# Patient Record
Sex: MALE | Race: BLACK OR AFRICAN AMERICAN | NOT HISPANIC OR LATINO | Employment: OTHER | URBAN - METROPOLITAN AREA
[De-identification: names, ages, dates, MRNs, and addresses within clinical notes are randomized per-mention and may not be internally consistent; named-entity substitution may affect disease eponyms.]

---

## 2021-04-01 PROBLEM — M48.061 SPINAL STENOSIS OF LUMBAR REGION: Status: ACTIVE | Noted: 2021-04-01

## 2021-04-01 PROBLEM — E11.9 CONTROLLED TYPE 2 DIABETES MELLITUS WITHOUT COMPLICATION, WITHOUT LONG-TERM CURRENT USE OF INSULIN: Status: ACTIVE | Noted: 2021-04-01

## 2021-04-01 PROBLEM — I10 ESSENTIAL HYPERTENSION: Status: ACTIVE | Noted: 2021-04-01

## 2021-04-01 PROBLEM — E78.49 OTHER HYPERLIPIDEMIA: Status: ACTIVE | Noted: 2021-04-01

## 2021-04-05 PROBLEM — Z13.84 ENCOUNTER FOR SCREENING FOR DENTAL DISORDER: Status: ACTIVE | Noted: 2021-04-05

## 2021-07-05 PROBLEM — Z13.84 ENCOUNTER FOR SCREENING FOR DENTAL DISORDER: Status: RESOLVED | Noted: 2021-04-05 | Resolved: 2021-07-05

## 2021-09-21 PROBLEM — B35.1 ONYCHOMYCOSIS OF GREAT TOE: Status: ACTIVE | Noted: 2021-09-21

## 2021-09-30 ENCOUNTER — LAB VISIT (OUTPATIENT)
Dept: LAB | Facility: HOSPITAL | Age: 71
End: 2021-09-30
Attending: NURSE PRACTITIONER

## 2021-09-30 DIAGNOSIS — E11.9 CONTROLLED TYPE 2 DIABETES MELLITUS WITHOUT COMPLICATION, WITHOUT LONG-TERM CURRENT USE OF INSULIN: ICD-10-CM

## 2021-09-30 LAB
ALBUMIN SERPL BCP-MCNC: 4 G/DL (ref 3.5–5.2)
ALP SERPL-CCNC: 61 U/L (ref 55–135)
ALT SERPL W/O P-5'-P-CCNC: 11 U/L (ref 10–44)
AST SERPL-CCNC: 17 U/L (ref 10–40)
BASOPHILS # BLD AUTO: 0.06 K/UL (ref 0–0.2)
BASOPHILS NFR BLD: 1.7 % (ref 0–1.9)
BILIRUB DIRECT SERPL-MCNC: 0.2 MG/DL (ref 0.1–0.3)
BILIRUB SERPL-MCNC: 0.3 MG/DL (ref 0.1–1)
CHOLEST SERPL-MCNC: 125 MG/DL (ref 120–199)
CHOLEST/HDLC SERPL: 2.9 {RATIO} (ref 2–5)
DIFFERENTIAL METHOD: ABNORMAL
EOSINOPHIL # BLD AUTO: 0.3 K/UL (ref 0–0.5)
EOSINOPHIL NFR BLD: 7 % (ref 0–8)
ERYTHROCYTE [DISTWIDTH] IN BLOOD BY AUTOMATED COUNT: 14.6 % (ref 11.5–14.5)
HCT VFR BLD AUTO: 38.5 % (ref 40–54)
HDLC SERPL-MCNC: 43 MG/DL (ref 40–75)
HDLC SERPL: 34.4 % (ref 20–50)
HGB BLD-MCNC: 12.6 G/DL (ref 14–18)
IMM GRANULOCYTES # BLD AUTO: 0.01 K/UL (ref 0–0.04)
IMM GRANULOCYTES NFR BLD AUTO: 0.3 % (ref 0–0.5)
LDLC SERPL CALC-MCNC: 64 MG/DL (ref 63–159)
LYMPHOCYTES # BLD AUTO: 1.2 K/UL (ref 1–4.8)
LYMPHOCYTES NFR BLD: 33.7 % (ref 18–48)
MCH RBC QN AUTO: 29.3 PG (ref 27–31)
MCHC RBC AUTO-ENTMCNC: 32.7 G/DL (ref 32–36)
MCV RBC AUTO: 90 FL (ref 82–98)
MONOCYTES # BLD AUTO: 0.4 K/UL (ref 0.3–1)
MONOCYTES NFR BLD: 11.8 % (ref 4–15)
NEUTROPHILS # BLD AUTO: 1.6 K/UL (ref 1.8–7.7)
NEUTROPHILS NFR BLD: 45.5 % (ref 38–73)
NONHDLC SERPL-MCNC: 82 MG/DL
NRBC BLD-RTO: 0 /100 WBC
PLATELET # BLD AUTO: 258 K/UL (ref 150–450)
PMV BLD AUTO: 11.4 FL (ref 9.2–12.9)
PROT SERPL-MCNC: 7.3 G/DL (ref 6–8.4)
RBC # BLD AUTO: 4.3 M/UL (ref 4.6–6.2)
TRIGL SERPL-MCNC: 90 MG/DL (ref 30–150)
WBC # BLD AUTO: 3.56 K/UL (ref 3.9–12.7)

## 2021-09-30 PROCEDURE — 83036 HEMOGLOBIN GLYCOSYLATED A1C: CPT | Performed by: NURSE PRACTITIONER

## 2021-09-30 PROCEDURE — 80061 LIPID PANEL: CPT | Performed by: NURSE PRACTITIONER

## 2021-09-30 PROCEDURE — 80076 HEPATIC FUNCTION PANEL: CPT | Performed by: NURSE PRACTITIONER

## 2021-09-30 PROCEDURE — 85025 COMPLETE CBC W/AUTO DIFF WBC: CPT | Performed by: NURSE PRACTITIONER

## 2021-10-01 LAB
ESTIMATED AVG GLUCOSE: 131 MG/DL (ref 68–131)
HBA1C MFR BLD: 6.2 % (ref 4–5.6)

## 2022-03-03 ENCOUNTER — LAB VISIT (OUTPATIENT)
Dept: LAB | Facility: HOSPITAL | Age: 72
End: 2022-03-03
Attending: NURSE PRACTITIONER

## 2022-03-03 DIAGNOSIS — R59.0 LYMPHADENOPATHY OF RIGHT CERVICAL REGION: ICD-10-CM

## 2022-03-03 DIAGNOSIS — E11.9 CONTROLLED TYPE 2 DIABETES MELLITUS WITHOUT COMPLICATION, WITHOUT LONG-TERM CURRENT USE OF INSULIN: ICD-10-CM

## 2022-03-03 DIAGNOSIS — D64.9 ANEMIA, UNSPECIFIED TYPE: ICD-10-CM

## 2022-03-03 LAB
ALBUMIN SERPL BCP-MCNC: 4.2 G/DL (ref 3.5–5.2)
ALP SERPL-CCNC: 84 U/L (ref 55–135)
ALT SERPL W/O P-5'-P-CCNC: 17 U/L (ref 10–44)
ANION GAP SERPL CALC-SCNC: 8 MMOL/L (ref 8–16)
AST SERPL-CCNC: 16 U/L (ref 10–40)
BASOPHILS # BLD AUTO: 0.05 K/UL (ref 0–0.2)
BASOPHILS NFR BLD: 1.2 % (ref 0–1.9)
BILIRUB SERPL-MCNC: 0.2 MG/DL (ref 0.1–1)
BUN SERPL-MCNC: 10 MG/DL (ref 8–23)
CALCIUM SERPL-MCNC: 9.4 MG/DL (ref 8.7–10.5)
CHLORIDE SERPL-SCNC: 103 MMOL/L (ref 95–110)
CO2 SERPL-SCNC: 28 MMOL/L (ref 23–29)
CREAT SERPL-MCNC: 0.8 MG/DL (ref 0.5–1.4)
DIFFERENTIAL METHOD: ABNORMAL
EOSINOPHIL # BLD AUTO: 0.3 K/UL (ref 0–0.5)
EOSINOPHIL NFR BLD: 6.1 % (ref 0–8)
ERYTHROCYTE [DISTWIDTH] IN BLOOD BY AUTOMATED COUNT: 13.8 % (ref 11.5–14.5)
EST. GFR  (AFRICAN AMERICAN): >60 ML/MIN/1.73 M^2
EST. GFR  (NON AFRICAN AMERICAN): >60 ML/MIN/1.73 M^2
FERRITIN SERPL-MCNC: 155 NG/ML (ref 20–300)
GLUCOSE SERPL-MCNC: 111 MG/DL (ref 70–110)
HCT VFR BLD AUTO: 40.1 % (ref 40–54)
HGB BLD-MCNC: 12.5 G/DL (ref 14–18)
IMM GRANULOCYTES # BLD AUTO: 0.01 K/UL (ref 0–0.04)
IMM GRANULOCYTES NFR BLD AUTO: 0.2 % (ref 0–0.5)
IRON SERPL-MCNC: 76 UG/DL (ref 45–160)
LYMPHOCYTES # BLD AUTO: 1.3 K/UL (ref 1–4.8)
LYMPHOCYTES NFR BLD: 31.6 % (ref 18–48)
MCH RBC QN AUTO: 29.1 PG (ref 27–31)
MCHC RBC AUTO-ENTMCNC: 31.2 G/DL (ref 32–36)
MCV RBC AUTO: 94 FL (ref 82–98)
MONOCYTES # BLD AUTO: 0.5 K/UL (ref 0.3–1)
MONOCYTES NFR BLD: 11.2 % (ref 4–15)
NEUTROPHILS # BLD AUTO: 2 K/UL (ref 1.8–7.7)
NEUTROPHILS NFR BLD: 49.7 % (ref 38–73)
NRBC BLD-RTO: 0 /100 WBC
PLATELET # BLD AUTO: 269 K/UL (ref 150–450)
PMV BLD AUTO: 10.7 FL (ref 9.2–12.9)
POTASSIUM SERPL-SCNC: 4.6 MMOL/L (ref 3.5–5.1)
PROT SERPL-MCNC: 7.7 G/DL (ref 6–8.4)
RBC # BLD AUTO: 4.29 M/UL (ref 4.6–6.2)
SATURATED IRON: 15 % (ref 20–50)
SODIUM SERPL-SCNC: 139 MMOL/L (ref 136–145)
TOTAL IRON BINDING CAPACITY: 505 UG/DL (ref 250–450)
TRANSFERRIN SERPL-MCNC: 341 MG/DL (ref 200–375)
WBC # BLD AUTO: 4.11 K/UL (ref 3.9–12.7)

## 2022-03-03 PROCEDURE — 84466 ASSAY OF TRANSFERRIN: CPT | Performed by: NURSE PRACTITIONER

## 2022-03-03 PROCEDURE — 85025 COMPLETE CBC W/AUTO DIFF WBC: CPT | Performed by: NURSE PRACTITIONER

## 2022-03-03 PROCEDURE — 82728 ASSAY OF FERRITIN: CPT | Performed by: NURSE PRACTITIONER

## 2022-03-03 PROCEDURE — 80053 COMPREHEN METABOLIC PANEL: CPT | Performed by: NURSE PRACTITIONER

## 2022-03-03 PROCEDURE — 83036 HEMOGLOBIN GLYCOSYLATED A1C: CPT | Performed by: NURSE PRACTITIONER

## 2022-03-04 LAB
ESTIMATED AVG GLUCOSE: 131 MG/DL (ref 68–131)
HBA1C MFR BLD: 6.2 % (ref 4–5.6)

## 2024-05-02 ENCOUNTER — OFFICE VISIT (OUTPATIENT)
Facility: CLINIC | Age: 74
End: 2024-05-02
Payer: MEDICARE

## 2024-05-02 VITALS
HEIGHT: 74 IN | OXYGEN SATURATION: 95 % | RESPIRATION RATE: 18 BRPM | WEIGHT: 222.69 LBS | TEMPERATURE: 98 F | SYSTOLIC BLOOD PRESSURE: 128 MMHG | HEART RATE: 67 BPM | DIASTOLIC BLOOD PRESSURE: 70 MMHG | BODY MASS INDEX: 28.58 KG/M2

## 2024-05-02 DIAGNOSIS — R35.89 INCREASED URINE OUTPUT: Primary | ICD-10-CM

## 2024-05-02 DIAGNOSIS — E11.9 TYPE 2 DIABETES MELLITUS WITHOUT COMPLICATION, WITHOUT LONG-TERM CURRENT USE OF INSULIN: ICD-10-CM

## 2024-05-02 DIAGNOSIS — R35.0 FREQUENCY OF URINATION: ICD-10-CM

## 2024-05-02 PROCEDURE — 99214 OFFICE O/P EST MOD 30 MIN: CPT | Mod: PBBFAC,PN

## 2024-05-02 PROCEDURE — 99999 PR PBB SHADOW E&M-EST. PATIENT-LVL IV: CPT | Mod: PBBFAC,,,

## 2024-05-02 PROCEDURE — 99214 OFFICE O/P EST MOD 30 MIN: CPT | Mod: S$PBB,,,

## 2024-05-02 RX ORDER — INSULIN PUMP SYRINGE, 3 ML
EACH MISCELLANEOUS
Qty: 1 EACH | Refills: 0 | Status: SHIPPED | OUTPATIENT
Start: 2024-05-02 | End: 2025-05-02

## 2024-05-02 RX ORDER — TAMSULOSIN HYDROCHLORIDE 0.4 MG/1
0.4 CAPSULE ORAL DAILY
Qty: 30 CAPSULE | Refills: 2 | Status: SHIPPED | OUTPATIENT
Start: 2024-05-02 | End: 2024-07-31

## 2024-05-02 RX ORDER — METFORMIN HYDROCHLORIDE 500 MG/1
500 TABLET ORAL 2 TIMES DAILY WITH MEALS
Qty: 180 TABLET | Refills: 0 | Status: SHIPPED | OUTPATIENT
Start: 2024-05-02 | End: 2024-07-31

## 2024-05-02 RX ORDER — HYDROCODONE BITARTRATE AND ACETAMINOPHEN 10; 325 MG/1; MG/1
1 TABLET ORAL 4 TIMES DAILY PRN
COMMUNITY
Start: 2024-04-22

## 2024-05-02 RX ORDER — CARVEDILOL 25 MG/1
1 TABLET ORAL 2 TIMES DAILY WITH MEALS
COMMUNITY
Start: 2023-05-18 | End: 2024-05-17

## 2024-05-02 RX ORDER — LOSARTAN POTASSIUM 100 MG/1
1 TABLET ORAL DAILY
COMMUNITY
Start: 2024-02-14

## 2024-05-02 RX ORDER — AMLODIPINE BESYLATE 2.5 MG/1
1 TABLET ORAL 2 TIMES DAILY
COMMUNITY
Start: 2024-02-05

## 2024-05-02 RX ORDER — LANCETS
EACH MISCELLANEOUS
Qty: 200 EACH | Refills: 0 | Status: SHIPPED | OUTPATIENT
Start: 2024-05-02

## 2024-05-02 RX ORDER — FENOFIBRATE 160 MG/1
1 TABLET ORAL DAILY
COMMUNITY
Start: 2023-05-18

## 2024-05-02 NOTE — PROGRESS NOTES
SUBJECTIVE     Chief Complaint   Patient presents with    Urinary Frequency     Follow up       HPI  4/18/2024  Hugo Daley is a 73 y.o. male with multiple medical diagnoses as listed in the medical history and problem list that presents for evaluation for increased urination for the past month. Pt is new to me. Pt states he just made appt due to frequent night urination symptoms. Pt states he had these symptoms once before and it seem to have cleared up over several weeks. Pt states it has since returned but not cleared up. Pt states he uses the bathroom about 10-12 times throughout the night. Pt denies thirst, headaches, blurry vision, or pain. Pt reports sleep disturbances where he is averaging about 3-4 hours a sleep a night due to waking up to urinate. Pt reports taking OTC super beta prostate supplements. Pt states he may go a whole day and only urinate once but frequency. Pt reports he has been taking them about 1 month with little relief from frequent urination. Pt reports drinking tequila 3-4 times a week.     5/2/2024  Pt presents today for a follow up for increased urine output. Pt states that he has been fully compliant with Rx Medication and states he has completed therapy dose. Pt state he has also noticed that he is urinating less frequently about 3-4 times a night verses 10-12 times a night. Pt also reports he urinates very little during the day but mostly at night when he is trying to sleep.  Pt also states he is no longer drinking tequila at this time.     Also discussed with patient most recent labs. Informed patient of current A1c of 6.7% up from 2 years ago of 6.2. Pt states he has been drinking [employer] Rootbeer and Lemonade most all the time. Pt denies urgency, changes in vision, fever, chills, body aches, or recent hospitalizations.         PAST MEDICAL HISTORY:  Past Medical History:   Diagnosis Date    Arthritis     Diabetes mellitus, type 2     Hyperlipidemia     Hypertension        PAST  SURGICAL HISTORY:  Past Surgical History:   Procedure Laterality Date    lower back surgery         SOCIAL HISTORY:  Social History     Socioeconomic History    Marital status:    Occupational History    Occupation: self   Tobacco Use    Smoking status: Former     Current packs/day: 0.00     Average packs/day: 1 pack/day for 30.0 years (30.0 ttl pk-yrs)     Types: Cigarettes     Start date: 1971     Quit date: 2001     Years since quittin.1    Smokeless tobacco: Never   Substance and Sexual Activity    Alcohol use: Yes     Alcohol/week: 2.0 standard drinks of alcohol     Types: 2 Cans of beer per week     Comment: 1-2 cans every couple of days     Drug use: Never    Sexual activity: Yes     Partners: Female     Social Determinants of Health     Financial Resource Strain: Low Risk  (2024)    Overall Financial Resource Strain (CARDIA)     Difficulty of Paying Living Expenses: Not hard at all   Food Insecurity: No Food Insecurity (2024)    Hunger Vital Sign     Worried About Running Out of Food in the Last Year: Never true     Ran Out of Food in the Last Year: Never true   Transportation Needs: No Transportation Needs (2024)    PRAPARE - Transportation     Lack of Transportation (Medical): No     Lack of Transportation (Non-Medical): No   Physical Activity: Sufficiently Active (2024)    Exercise Vital Sign     Days of Exercise per Week: 5 days     Minutes of Exercise per Session: 40 min   Stress: No Stress Concern Present (2024)    [employer] - Occupational Stress Questionnaire     Feeling of Stress : Not at all   Housing Stability: Low Risk  (2024)    Housing Stability Vital Sign     Unable to Pay for Housing in the Last Year: No     Homeless in the Last Year: No       FAMILY HISTORY:  Family History   Problem Relation Name Age of Onset    Hypertension Mother      Hypertension Father      Hypertension Sister      Hypertension Brother       Amblyopia Neg Hx      Blindness Neg Hx      Cancer Neg Hx      Cataracts Neg Hx      Diabetes Neg Hx      Glaucoma Neg Hx      Macular degeneration Neg Hx      Retinal detachment Neg Hx      Strabismus Neg Hx      Stroke Neg Hx      Thyroid disease Neg Hx         ALLERGIES AND MEDICATIONS: updated and reviewed.  Review of patient's allergies indicates:  No Known Allergies  Current Outpatient Medications   Medication Sig Dispense Refill    amLODIPine (NORVASC) 2.5 MG tablet Take 1 tablet by mouth 2 (two) times daily.      aspirin 325 MG tablet Take 325 mg by mouth once.      carvediloL (COREG) 25 MG tablet Take 1 tablet by mouth 2 (two) times daily with meals.      fenofibrate 160 MG Tab Take 1 tablet by mouth once daily.      HYDROcodone-acetaminophen (NORCO)  mg per tablet Take 1 tablet by mouth 4 (four) times daily as needed.      losartan (COZAAR) 100 MG tablet Take 1 tablet by mouth once daily.      olmesartan (BENICAR) 5 MG Tab Take 5 mg by mouth once daily.      omega-3 acid ethyl esters (LOVAZA) 1 gram capsule Take 2 g by mouth 2 (two) times daily.      rosuvastatin (CRESTOR) 10 MG tablet Take 10 mg by mouth once daily.      blood sugar diagnostic Strp To check BG 3 times daily, to use with insurance preferred meter 200 each 0    blood-glucose meter kit To check BG 3 times daily, to use with insurance preferred meter 1 each 0    hydrOXYzine pamoate (VISTARIL) 50 MG Cap TAKE 1 CAPSULE(50 MG) BY MOUTH EVERY NIGHT AS NEEDED FOR INSOMNIA (Patient not taking: Reported on 5/2/2024) 30 capsule 2    lancets Misc To check BG 3 times daily, to use with insurance preferred meter 200 each 0    metFORMIN (GLUCOPHAGE) 500 MG tablet Take 1 tablet (500 mg total) by mouth 2 (two) times daily with meals. 180 tablet 0    tamsulosin (FLOMAX) 0.4 mg Cap Take 1 capsule (0.4 mg total) by mouth once daily. 30 capsule 2     No current facility-administered medications for this visit.       ROS  Review of Systems  "  Constitutional:  Negative for appetite change, chills, diaphoresis, fatigue, fever and unexpected weight change.   HENT:  Negative for congestion, ear pain, postnasal drip, rhinorrhea, sinus pressure, sinus pain and sore throat.    Eyes:  Negative for pain and redness.   Respiratory:  Negative for cough, choking, chest tightness and shortness of breath.    Cardiovascular:  Negative for chest pain, palpitations and leg swelling.   Gastrointestinal:  Negative for abdominal pain, constipation, diarrhea, nausea and vomiting.   Endocrine: Negative for cold intolerance and heat intolerance.   Genitourinary:  Positive for frequency. Negative for decreased urine volume, difficulty urinating, dysuria, flank pain, hematuria, penile pain and urgency.   Musculoskeletal:  Negative for back pain, neck pain and neck stiffness.   Skin:  Negative for color change, pallor and rash.   Allergic/Immunologic: Negative for food allergies.   Neurological:  Negative for dizziness, tremors, weakness, light-headedness and headaches.   Psychiatric/Behavioral:  Negative for agitation and confusion. The patient is not nervous/anxious.          OBJECTIVE     Physical Exam  Vitals:    05/02/24 1321   BP: 128/70   Pulse: 67   Resp: 18   Temp: 98.4 °F (36.9 °C)    Body mass index is 28.59 kg/m².  Weight: 101 kg (222 lb 10.6 oz)   Height: 6' 2" (188 cm)     Physical Exam  Constitutional:       General: He is not in acute distress.     Appearance: Normal appearance. He is not ill-appearing or diaphoretic.   HENT:      Right Ear: Tympanic membrane normal. There is no impacted cerumen.      Left Ear: Tympanic membrane normal. There is no impacted cerumen.      Nose: Nose normal. No congestion or rhinorrhea.      Mouth/Throat:      Mouth: Mucous membranes are moist.      Pharynx: Oropharynx is clear. No oropharyngeal exudate or posterior oropharyngeal erythema.   Eyes:      General:         Right eye: No discharge.         Left eye: No discharge. "   Cardiovascular:      Rate and Rhythm: Normal rate and regular rhythm.      Pulses: Normal pulses.      Heart sounds: Normal heart sounds.   Pulmonary:      Effort: Pulmonary effort is normal.      Breath sounds: Normal breath sounds.   Abdominal:      General: Bowel sounds are normal. There is no distension.      Palpations: Abdomen is soft.      Tenderness: There is no abdominal tenderness. There is no guarding.   Musculoskeletal:         General: No swelling or tenderness.      Cervical back: Normal range of motion. No rigidity or tenderness.      Right lower leg: No edema.      Left lower leg: No edema.   Skin:     General: Skin is warm and dry.      Findings: No bruising, erythema, lesion or rash.   Neurological:      Mental Status: He is alert and oriented to person, place, and time.      Motor: No weakness.      Gait: Gait normal.   Psychiatric:         Mood and Affect: Mood normal.         Behavior: Behavior normal.         Health Maintenance         Date Due Completion Date    Hepatitis C Screening Never done ---    Foot Exam Never done ---    Shingles Vaccine (1 of 2) Never done ---    RSV Vaccine (Age 60+ and Pregnant patients) (1 - 1-dose 60+ series) Never done ---    Pneumococcal Vaccines (Age 65+) (1 of 1 - PCV) Never done ---    Abdominal Aortic Aneurysm Screening Never done ---    COVID-19 Vaccine (5 - 2023-24 season) 09/01/2023 1/13/2023    Influenza Vaccine (Season Ended) 09/01/2024 ---    Hemoglobin A1c 04/18/2025 4/18/2024    Override on 4/5/2021: Done (scanned in media- [facility] lab)    Lipid Panel 08/09/2028 8/9/2023    Colorectal Cancer Screening 07/01/2030 7/1/2020 (Done)    Override on 7/1/2020: Done (per pt normal)    TETANUS VACCINE 09/20/2031 9/20/2021              ASSESSMENT     73 y.o. male with     1. Increased urine output    2. Type 2 diabetes mellitus without complication, without long-term current use of insulin    3. Frequency of urination        PLAN:     1. Increased urine  output  Improving, Treating;   - Educated patient on changes in urinary patterns with age and sometime incomplete emptying.   - Instructed patient to began Rx therapy daily in the morning to promote emptying with urination  - Follow up in 3 months  - tamsulosin (FLOMAX) 0.4 mg Cap; Take 1 capsule (0.4 mg total) by mouth once daily.  Dispense: 30 capsule; Refill: 2    2. Type 2 diabetes mellitus without complication, without long-term current use of insulin  Controlled; Treating  - Latest A1c elevated to 6.7% from previous 6.2% two years ago.   - Instructed patient to began Rx therapy of Metformin 500 twice daily with food.   - Follow up in 3 months  - metFORMIN (GLUCOPHAGE) 500 MG tablet; Take 1 tablet (500 mg total) by mouth 2 (two) times daily with meals.  Dispense: 180 tablet; Refill: 0  - blood-glucose meter kit; To check BG 3 times daily, to use with insurance preferred meter  Dispense: 1 each; Refill: 0  - lancets Misc; To check BG 3 times daily, to use with insurance preferred meter  Dispense: 200 each; Refill: 0  - blood sugar diagnostic Strp; To check BG 3 times daily, to use with insurance preferred meter  Dispense: 200 each; Refill: 0    3. Frequency of urination  Improving, Treating;   - Educated patient on changes in urinary patterns with age and sometime incomplete emptying.   - Instructed patient to began Rx therapy daily in the morning to promote emptying with urination  - Follow up in 3 months  - tamsulosin (FLOMAX) 0.4 mg Cap; Take 1 capsule (0.4 mg total) by mouth once daily.  Dispense: 30 capsule; Refill: 2    RTC in 3 months or sooner if needed    Jag Shaikh, PROMISE, APRN, FNP-BC  [facility]  05/02/2024 1:40 PM

## 2024-08-02 ENCOUNTER — OFFICE VISIT (OUTPATIENT)
Facility: CLINIC | Age: 74
End: 2024-08-02
Payer: MEDICARE

## 2024-08-02 VITALS
RESPIRATION RATE: 18 BRPM | OXYGEN SATURATION: 96 % | SYSTOLIC BLOOD PRESSURE: 126 MMHG | HEART RATE: 90 BPM | DIASTOLIC BLOOD PRESSURE: 80 MMHG | WEIGHT: 212.5 LBS | TEMPERATURE: 98 F | BODY MASS INDEX: 27.27 KG/M2 | HEIGHT: 74 IN

## 2024-08-02 DIAGNOSIS — E78.49 OTHER HYPERLIPIDEMIA: ICD-10-CM

## 2024-08-02 DIAGNOSIS — E11.9 TYPE 2 DIABETES MELLITUS WITHOUT COMPLICATION, WITHOUT LONG-TERM CURRENT USE OF INSULIN: Primary | ICD-10-CM

## 2024-08-02 DIAGNOSIS — M54.2 NECK PAIN: ICD-10-CM

## 2024-08-02 PROCEDURE — 99999 PR PBB SHADOW E&M-EST. PATIENT-LVL V: CPT | Mod: PBBFAC,,,

## 2024-08-02 PROCEDURE — 99215 OFFICE O/P EST HI 40 MIN: CPT | Mod: PBBFAC,PN

## 2024-08-02 PROCEDURE — 99214 OFFICE O/P EST MOD 30 MIN: CPT | Mod: S$PBB,,,

## 2024-08-02 RX ORDER — METHYLPREDNISOLONE 4 MG/1
TABLET ORAL
COMMUNITY
Start: 2024-07-11

## 2024-08-02 RX ORDER — CYCLOBENZAPRINE HCL 10 MG
10 TABLET ORAL 3 TIMES DAILY
COMMUNITY
Start: 2024-07-11

## 2024-08-02 RX ORDER — CELECOXIB 200 MG/1
200 CAPSULE ORAL
COMMUNITY
Start: 2024-07-12

## 2025-07-09 ENCOUNTER — PATIENT OUTREACH (OUTPATIENT)
Dept: ADMINISTRATIVE | Facility: OTHER | Age: 75
End: 2025-07-09
Payer: MEDICARE

## 2025-07-09 ENCOUNTER — LAB VISIT (OUTPATIENT)
Dept: LAB | Facility: HOSPITAL | Age: 75
End: 2025-07-09
Payer: MEDICARE

## 2025-07-09 ENCOUNTER — OFFICE VISIT (OUTPATIENT)
Facility: CLINIC | Age: 75
End: 2025-07-09
Payer: MEDICARE

## 2025-07-09 VITALS
TEMPERATURE: 98 F | DIASTOLIC BLOOD PRESSURE: 88 MMHG | RESPIRATION RATE: 18 BRPM | SYSTOLIC BLOOD PRESSURE: 136 MMHG | BODY MASS INDEX: 27.63 KG/M2 | HEIGHT: 74 IN | HEART RATE: 66 BPM | OXYGEN SATURATION: 96 % | WEIGHT: 215.25 LBS

## 2025-07-09 DIAGNOSIS — Z11.59 NEED FOR HEPATITIS C SCREENING TEST: ICD-10-CM

## 2025-07-09 DIAGNOSIS — I10 ESSENTIAL HYPERTENSION: ICD-10-CM

## 2025-07-09 DIAGNOSIS — E78.49 OTHER HYPERLIPIDEMIA: ICD-10-CM

## 2025-07-09 DIAGNOSIS — G47.09 OTHER INSOMNIA: ICD-10-CM

## 2025-07-09 DIAGNOSIS — E11.9 TYPE 2 DIABETES MELLITUS WITHOUT COMPLICATION, WITHOUT LONG-TERM CURRENT USE OF INSULIN: Primary | ICD-10-CM

## 2025-07-09 DIAGNOSIS — E11.9 TYPE 2 DIABETES MELLITUS WITHOUT COMPLICATION, WITHOUT LONG-TERM CURRENT USE OF INSULIN: ICD-10-CM

## 2025-07-09 LAB
ALBUMIN SERPL BCP-MCNC: 4.3 G/DL (ref 3.5–5.2)
ALP SERPL-CCNC: 74 UNIT/L (ref 40–150)
ALT SERPL W/O P-5'-P-CCNC: 18 UNIT/L (ref 10–44)
ANION GAP (OHS): 10 MMOL/L (ref 8–16)
AST SERPL-CCNC: 13 UNIT/L (ref 11–45)
BILIRUB SERPL-MCNC: 0.3 MG/DL (ref 0.1–1)
BUN SERPL-MCNC: 13 MG/DL (ref 8–23)
CALCIUM SERPL-MCNC: 9.3 MG/DL (ref 8.7–10.5)
CHLORIDE SERPL-SCNC: 106 MMOL/L (ref 95–110)
CO2 SERPL-SCNC: 25 MMOL/L (ref 23–29)
CREAT SERPL-MCNC: 0.8 MG/DL (ref 0.5–1.4)
GFR SERPLBLD CREATININE-BSD FMLA CKD-EPI: >60 ML/MIN/1.73/M2
GLUCOSE SERPL-MCNC: 98 MG/DL (ref 70–110)
POTASSIUM SERPL-SCNC: 4.4 MMOL/L (ref 3.5–5.1)
PROT SERPL-MCNC: 7.9 GM/DL (ref 6–8.4)
SODIUM SERPL-SCNC: 141 MMOL/L (ref 136–145)

## 2025-07-09 PROCEDURE — 86803 HEPATITIS C AB TEST: CPT

## 2025-07-09 PROCEDURE — 36415 COLL VENOUS BLD VENIPUNCTURE: CPT | Mod: PN

## 2025-07-09 PROCEDURE — 99215 OFFICE O/P EST HI 40 MIN: CPT | Mod: PBBFAC,PN

## 2025-07-09 PROCEDURE — 80053 COMPREHEN METABOLIC PANEL: CPT

## 2025-07-09 PROCEDURE — 99999 PR PBB SHADOW E&M-EST. PATIENT-LVL V: CPT | Mod: PBBFAC,,,

## 2025-07-09 PROCEDURE — G2211 COMPLEX E/M VISIT ADD ON: HCPCS | Mod: ,,,

## 2025-07-09 PROCEDURE — 99214 OFFICE O/P EST MOD 30 MIN: CPT | Mod: S$PBB,,,

## 2025-07-09 PROCEDURE — 82570 ASSAY OF URINE CREATININE: CPT

## 2025-07-09 RX ORDER — METFORMIN HYDROCHLORIDE 500 MG/1
500 TABLET ORAL 2 TIMES DAILY WITH MEALS
Qty: 60 TABLET | Refills: 6 | Status: SHIPPED | OUTPATIENT
Start: 2025-07-09

## 2025-07-09 NOTE — PROGRESS NOTES
CHW - Initial Contact    This Community Health Worker updated the Social Determinant of Health questionnaire with patient during clinic visit today.    Pt identified barriers of most importance are: Patient did not report any barriers.      Other information discussed the patient needs / wants help with: SDOH updated. Patient did not report any barriers at this time. CHW advised patient if the need arise to reach out to me directly in regards. Patient's case will be closed.   Follow up required: No.

## 2025-07-09 NOTE — PROGRESS NOTES
SUBJECTIVE     Chief Complaint   Patient presents with    Annual Exam    Medication Refill       HPI  Hugo Daley is a 74 y.o. male with multiple medical diagnoses as listed in the medical history and problem list that presents for annual exam. Pt is new to me. He has a good appetite and eats well. He does exercise. Pt does take OTC supplements. He does not have any current stressors. Pt is UTD on age appropriate CA screening.     HPI    History of Present Illness    CHIEF COMPLAINT:  Patient presents today for diabetes medication refill.    DIABETES:  He monitors glucose at home with readings consistently between . Recent A1C in March was 6.7, indicating diabetes. He has a family history of diabetes and is seeking Metformin prescription for management.    HYPERTENSION:  Home blood pressure reading is 117/73, typically remaining under 117 though tends to elevate during clinic visits. He continues losartan 100mg for management.    SLEEP:  He reports poor sleep quality with significant fragmentation, describing a pattern of sleeping for one hour before waking, followed by another hour of sleep before waking again.    EXERCISE AND LIFESTYLE:  He walks three miles daily and expresses satisfaction with this as his primary form of physical activity.    MENTAL HEALTH:  He reports minimal stress levels with no significant stressors.    MEDICATIONS AND SUPPLEMENTS:  He takes losartan 100mg and fish oil supplement.    DENTAL:  Last dental exam was prior to COVID-19 pandemic.      ROS:  General: -fever, -chills, -fatigue, -weight gain, -weight loss, +difficulty staying asleep, +sleep disturbances  Eyes: -vision changes, -redness, -discharge  ENT: -ear pain, -nasal congestion, -sore throat  Cardiovascular: -chest pain, -palpitations, -lower extremity edema  Respiratory: -cough, -shortness of breath  Gastrointestinal: -abdominal pain, -nausea, -vomiting, -diarrhea, -constipation, -blood in stool  Genitourinary: -dysuria,  -hematuria, -frequency  Musculoskeletal: -joint pain, -muscle pain  Skin: -rash, -lesion  Neurological: -headache, -dizziness, -numbness, -tingling  Psychiatric: -anxiety, -depression, -sleep difficulty         PAST MEDICAL HISTORY:  Past Medical History:   Diagnosis Date    Arthritis     Diabetes mellitus, type 2     Hyperlipidemia     Hypertension        PAST SURGICAL HISTORY:  Past Surgical History:   Procedure Laterality Date    lower back surgery  1995       SOCIAL HISTORY:  Social History[1]    FAMILY HISTORY:  Family History   Problem Relation Name Age of Onset    Hypertension Mother      Hypertension Father      Hypertension Sister      Hypertension Brother      No Known Problems Daughter      No Known Problems Son      No Known Problems Maternal Aunt      No Known Problems Maternal Uncle      No Known Problems Paternal Aunt      No Known Problems Paternal Uncle      No Known Problems Maternal Grandmother      No Known Problems Maternal Grandfather      No Known Problems Paternal Grandmother      No Known Problems Paternal Grandfather      No Known Problems Maternal Cousin      No Known Problems Paternal Cousin      No Known Problems Other      Amblyopia Neg Hx      Blindness Neg Hx      Cancer Neg Hx      Cataracts Neg Hx      Diabetes Neg Hx      Glaucoma Neg Hx      Macular degeneration Neg Hx      Retinal detachment Neg Hx      Strabismus Neg Hx      Stroke Neg Hx      Thyroid disease Neg Hx         ALLERGIES AND MEDICATIONS: updated and reviewed.  Review of patient's allergies indicates:  No Known Allergies  Current Outpatient Medications   Medication Sig Dispense Refill    amLODIPine (NORVASC) 2.5 MG tablet Take 1 tablet by mouth 2 (two) times daily.      aspirin 325 MG tablet Take 325 mg by mouth once.      blood sugar diagnostic Strp To check BG 3 times daily, to use with insurance preferred meter 200 each 0    carvediloL (COREG) 25 MG tablet Take 1 tablet by mouth 2 (two) times daily with meals.    "   lancets Misc To check BG 3 times daily, to use with insurance preferred meter 200 each 0    losartan (COZAAR) 100 MG tablet Take 1 tablet by mouth once daily.      olmesartan (BENICAR) 5 MG Tab Take 5 mg by mouth once daily.      omega-3 acid ethyl esters (LOVAZA) 1 gram capsule Take 2 g by mouth 2 (two) times daily.      rosuvastatin (CRESTOR) 10 MG tablet Take 10 mg by mouth once daily.      blood-glucose meter kit To check BG 3 times daily, to use with insurance preferred meter 1 each 0    metFORMIN (GLUCOPHAGE) 500 MG tablet Take 1 tablet (500 mg total) by mouth 2 (two) times daily with meals. 60 tablet 6    tamsulosin (FLOMAX) 0.4 mg Cap Take 1 capsule (0.4 mg total) by mouth once daily. 30 capsule 2     No current facility-administered medications for this visit.       OBJECTIVE     Physical Exam  Vitals:    07/09/25 1316   BP: 136/88   Pulse: 66   Resp: 18   Temp: 97.7 °F (36.5 °C)    Body mass index is 27.64 kg/m².  Weight: 97.7 kg (215 lb 4.5 oz)   Height: 6' 2" (188 cm)     Physical Exam  Vitals reviewed.   Constitutional:       General: He is not in acute distress.  HENT:      Right Ear: External ear normal.      Left Ear: External ear normal.      Nose: Nose normal.      Mouth/Throat:      Mouth: Mucous membranes are moist.   Eyes:      Extraocular Movements: Extraocular movements intact.      Conjunctiva/sclera: Conjunctivae normal.      Pupils: Pupils are equal, round, and reactive to light.   Pulmonary:      Effort: Pulmonary effort is normal.   Abdominal:      General: There is no distension.      Palpations: Abdomen is soft.   Musculoskeletal:         General: No swelling. Normal range of motion.      Cervical back: Normal range of motion.   Skin:     General: Skin is warm and dry.      Findings: No rash.   Neurological:      General: No focal deficit present.      Mental Status: He is alert and oriented to person, place, and time.   Psychiatric:         Mood and Affect: Mood normal.         " Behavior: Behavior normal.       Health Maintenance         Date Due Completion Date    Foot Exam Never done ---    Pneumococcal Vaccines (Age 50+) (1 of 2 - PCV) Never done ---    Shingles Vaccine (1 of 2) Never done ---    Diabetic Eye Exam 12/18/2014 12/18/2013    Abdominal Aortic Aneurysm Screening Never done ---    COVID-19 Vaccine (5 - 2024-25 season) 09/01/2024 1/13/2023    Influenza Vaccine (1) 09/01/2025 ---    Hemoglobin A1c 09/25/2025 3/25/2025    Override on 4/5/2021: Done (scanned in media- [facility])    RSV Vaccine (Age 60+ and Pregnant patients) (1 - 1-dose 75+ series) 12/19/2025 ---    Lipid Panel 03/25/2026 3/25/2025    Low Dose Statin 07/09/2026 7/9/2025    Diabetes Urine Screening 07/09/2026 7/9/2025    Colorectal Cancer Screening 07/01/2030 7/1/2020 (Done)    Override on 7/1/2020: Done (per pt normal)    TETANUS VACCINE 09/20/2031 9/20/2021          ASSESSMENT     74 y.o. male with     1. Type 2 diabetes mellitus without complication, without long-term current use of insulin    2. Essential hypertension    3. Need for hepatitis C screening test    4. Other hyperlipidemia    5. Other insomnia        PLAN:     1. Type 2 diabetes mellitus without complication, without long-term current use of insulin  Improving. Refilled metformin.   Ordered labs to check levels. Will treat accordingly once labs are reviewed.    Patient is encouraged to follow a diet low in carbohydrates and simple sugars.  Discussed simple vs. complex carbohydrates. Advised to focus on good food choices and increased physical activity and encouraged to adhere to medication regimen and/or lifestyle adjustments, and to check glucose level as recommended.  Contact office if glucose levels are not improving over time.  Will monitor HbA1c appropriately.    - metFORMIN (GLUCOPHAGE) 500 MG tablet; Take 1 tablet (500 mg total) by mouth 2 (two) times daily with meals.  Dispense: 60 tablet; Refill: 6  - Comprehensive Metabolic Panel;  Future  - Microalbumin/Creatinine Ratio, Urine; Future  - Ambulatory referral/consult to Optometry; Future    2. Essential hypertension  Stable.   - Continuing current medication regimen as prescribed by Dr. Donato (cardiologist): losartan 100 mg, amlodipine, and carvedilol.    3. Need for hepatitis C screening test  Due. Ordered.   - Hepatitis C Antibody; Future    4. Other hyperlipidemia  Stable.  Minimize fried foods, greasy foods, brown gravies, cheese and butter sauces. Increase baked fish, take fish oil pills (3000mg daily), eat almonds, and exercise in order  to raise your good cholesterol.    - Continue fenofibrate and fish oil supplement as prescribed by Dr. Donato    5. Other insomnia  Chronic.   -Counseled on sleep hygiene and recommended magnesium glycinate or L-threonate.   - Plan to refer to sleep medicine.       Assessment & Plan    TYPE 2 DIABETES MELLITUS WITHOUT COMPLICATION, WITHOUT LONG-TERM CURRENT USE OF INSULIN:  - Reviewed recent lab results from March showing A1C of 6.7 (slightly above target range of 6.5), indicating diabetes.  - Home blood sugar monitoring shows ranges from 99 to 111.  - Prescribed Metformin to be taken twice daily for diabetes management and advised patient to maintain blood sugar to prevent further A1C increase.  - Ordered diabetic eye exam and emphasized its annual importance.  - Ordered urine test for kidney function assessment, which patient can drop off at their convenience.  - Follow-up appointment scheduled in September for sugar test.    HYPERTENSION:  - Patient's blood pressure is well-controlled with home readings around 117/73, though elevations are noted during clinic visits, likely due to white coat syndrome.  - Continuing current medication regimen as prescribed by Dr. Donato (cardiologist): losartan 100 mg, amlodipine, and carvedilol.    HYPERLIPIDEMIA:  - Reviewed March lipid panel results showing elevated triglycerides and low HDL cholesterol,  despite good total cholesterol.  - Continuing fenofibrate and fish oil supplement as prescribed by Dr. Donato.    SLEEP DISORDER:  - Patient reports fragmented sleep patterns with approximately 1 hour of sleep at a time and multiple awakenings throughout the night.  - Patient denies having sleep apnea despite this fragmented sleep pattern.    GENERAL HEALTH MANAGEMENT:  - Patient maintains regular exercise regimen of walking 3 miles daily.  - Colonoscopy due until .  - Discussed potential benefits of managed care plans for additional coverage options.    FOLLOW-UP:  - Follow up with Dr. Barbosa.         Ani Stephens, MSN, APRN, FNP-C  [facility]  2025 1:35 PM    This note was generated with the assistance of ambient listening technology. Verbal consent was obtained by the patient and accompanying visitor(s) for the recording of patient appointment to facilitate this note. I attest to having reviewed and edited the generated note for accuracy, though some syntax or spelling errors may persist. Please contact the author of this note for any clarification.    Follow up in about 3 months (around 10/9/2025) for Dr. Barbosa.           [1]   Social History  Socioeconomic History    Marital status:    Occupational History    Occupation: self   Tobacco Use    Smoking status: Former     Current packs/day: 0.00     Average packs/day: 1 pack/day for 30.0 years (30.0 ttl pk-yrs)     Types: Cigarettes     Start date: 1971     Quit date: 2001     Years since quittin.3     Passive exposure: Past    Smokeless tobacco: Never   Substance and Sexual Activity    Alcohol use: Yes     Alcohol/week: 2.0 standard drinks of alcohol     Types: 2 Cans of beer per week     Comment: 1-2 cans every couple of days     Drug use: Never    Sexual activity: Yes     Partners: Female     Social Drivers of Health     Financial Resource Strain: Low Risk  (2025)    Overall Financial Resource Strain  (CARDIA)     Difficulty of Paying Living Expenses: Not very hard   Food Insecurity: No Food Insecurity (7/9/2025)    Hunger Vital Sign     Worried About Running Out of Food in the Last Year: Never true     Ran Out of Food in the Last Year: Never true   Transportation Needs: No Transportation Needs (7/9/2025)    PRAPARE - Transportation     Lack of Transportation (Medical): No     Lack of Transportation (Non-Medical): No   Physical Activity: Insufficiently Active (7/9/2025)    Exercise Vital Sign     Days of Exercise per Week: 3 days     Minutes of Exercise per Session: 20 min   Stress: Stress Concern Present (7/9/2025)    [employer] - Occupational Stress Questionnaire     Feeling of Stress : Rather much   Housing Stability: Low Risk  (7/9/2025)    Housing Stability Vital Sign     Unable to Pay for Housing in the Last Year: No     Number of Times Moved in the Last Year: 0     Homeless in the Last Year: No

## 2025-07-10 LAB
ALBUMIN/CREAT UR: 14.3 UG/MG
CREAT UR-MCNC: 161 MG/DL (ref 23–375)
HCV AB SERPL QL IA: NORMAL
MICROALBUMIN UR-MCNC: 23 UG/ML (ref ?–5000)

## 2025-07-25 ENCOUNTER — PATIENT MESSAGE (OUTPATIENT)
Dept: OPHTHALMOLOGY | Facility: CLINIC | Age: 75
End: 2025-07-25
Payer: MEDICARE